# Patient Record
Sex: FEMALE | Race: OTHER | Employment: FULL TIME | URBAN - METROPOLITAN AREA
[De-identification: names, ages, dates, MRNs, and addresses within clinical notes are randomized per-mention and may not be internally consistent; named-entity substitution may affect disease eponyms.]

---

## 2021-04-23 ENCOUNTER — IMMUNIZATION (OUTPATIENT)
Dept: FAMILY MEDICINE CLINIC | Age: 36
End: 2021-04-23
Payer: COMMERCIAL

## 2021-04-23 PROCEDURE — 91300 COVID-19, PFIZER VACCINE 30MCG/0.3ML DOSE: CPT | Performed by: NURSE PRACTITIONER

## 2021-04-23 PROCEDURE — 0001A COVID-19, PFIZER VACCINE 30MCG/0.3ML DOSE: CPT | Performed by: NURSE PRACTITIONER

## 2021-05-14 ENCOUNTER — IMMUNIZATION (OUTPATIENT)
Dept: FAMILY MEDICINE CLINIC | Age: 36
End: 2021-05-14
Payer: COMMERCIAL

## 2021-05-14 PROCEDURE — 0002A COVID-19, PFIZER VACCINE 30MCG/0.3ML DOSE: CPT | Performed by: FAMILY MEDICINE

## 2021-05-14 PROCEDURE — 91300 COVID-19, PFIZER VACCINE 30MCG/0.3ML DOSE: CPT | Performed by: FAMILY MEDICINE

## 2021-10-25 ENCOUNTER — TELEPHONE (OUTPATIENT)
Dept: INTERNAL MEDICINE CLINIC | Age: 36
End: 2021-10-25

## 2021-10-25 NOTE — TELEPHONE ENCOUNTER
----- Message from Jessica Drake sent at 10/25/2021  1:25 PM EDT -----  Subject: Message to Provider    QUESTIONS  Information for Provider? new patient have an appt on 12/28/21 request if   any appt come available sooner please call patient   ---------------------------------------------------------------------------  --------------  [address]  What is the best way for the office to contact you? OK to leave message on   voicemail  Preferred Call Back Phone Number? [phone]  ---------------------------------------------------------------------------  --------------  SCRIPT ANSWERS  Relationship to Patient?  Self

## 2021-10-27 NOTE — TELEPHONE ENCOUNTER
Left message for patient to call back to offer appointment with [facility] if they wanted to do that

## 2021-12-28 ENCOUNTER — OFFICE VISIT (OUTPATIENT)
Dept: INTERNAL MEDICINE CLINIC | Age: 36
End: 2021-12-28
Payer: COMMERCIAL

## 2021-12-28 VITALS
BODY MASS INDEX: 27.19 KG/M2 | DIASTOLIC BLOOD PRESSURE: 66 MMHG | SYSTOLIC BLOOD PRESSURE: 124 MMHG | HEIGHT: 61 IN | HEART RATE: 72 BPM | WEIGHT: 144 LBS | OXYGEN SATURATION: 97 % | RESPIRATION RATE: 16 BRPM

## 2021-12-28 DIAGNOSIS — Z13.6 ENCOUNTER FOR LIPID SCREENING FOR CARDIOVASCULAR DISEASE: ICD-10-CM

## 2021-12-28 DIAGNOSIS — Z11.4 ENCOUNTER FOR SCREENING FOR HIV: ICD-10-CM

## 2021-12-28 DIAGNOSIS — Z12.4 CERVICAL CANCER SCREENING: ICD-10-CM

## 2021-12-28 DIAGNOSIS — Z13.220 ENCOUNTER FOR LIPID SCREENING FOR CARDIOVASCULAR DISEASE: ICD-10-CM

## 2021-12-28 DIAGNOSIS — Z13.1 SCREENING FOR DIABETES MELLITUS (DM): ICD-10-CM

## 2021-12-28 DIAGNOSIS — Z23 NEED FOR VACCINATION: ICD-10-CM

## 2021-12-28 DIAGNOSIS — Z11.59 ENCOUNTER FOR HEPATITIS C SCREENING TEST FOR LOW RISK PATIENT: ICD-10-CM

## 2021-12-28 DIAGNOSIS — Z00.00 ANNUAL PHYSICAL EXAM: Primary | ICD-10-CM

## 2021-12-28 PROCEDURE — 90732 PPSV23 VACC 2 YRS+ SUBQ/IM: CPT | Performed by: INTERNAL MEDICINE

## 2021-12-28 PROCEDURE — 90472 IMMUNIZATION ADMIN EACH ADD: CPT | Performed by: INTERNAL MEDICINE

## 2021-12-28 PROCEDURE — 90715 TDAP VACCINE 7 YRS/> IM: CPT | Performed by: INTERNAL MEDICINE

## 2021-12-28 PROCEDURE — 99385 PREV VISIT NEW AGE 18-39: CPT | Performed by: INTERNAL MEDICINE

## 2021-12-28 PROCEDURE — 90471 IMMUNIZATION ADMIN: CPT | Performed by: INTERNAL MEDICINE

## 2021-12-28 SDOH — ECONOMIC STABILITY: FOOD INSECURITY: WITHIN THE PAST 12 MONTHS, YOU WORRIED THAT YOUR FOOD WOULD RUN OUT BEFORE YOU GOT MONEY TO BUY MORE.: NEVER TRUE

## 2021-12-28 SDOH — ECONOMIC STABILITY: FOOD INSECURITY: WITHIN THE PAST 12 MONTHS, THE FOOD YOU BOUGHT JUST DIDN'T LAST AND YOU DIDN'T HAVE MONEY TO GET MORE.: NEVER TRUE

## 2021-12-28 ASSESSMENT — ENCOUNTER SYMPTOMS
PHOTOPHOBIA: 0
ABDOMINAL PAIN: 0
DIARRHEA: 0
EYE PAIN: 0
COLOR CHANGE: 0
BACK PAIN: 0
SINUS PRESSURE: 0
CONSTIPATION: 0
COUGH: 0
WHEEZING: 0
SHORTNESS OF BREATH: 0
SINUS PAIN: 0
VOMITING: 0
TROUBLE SWALLOWING: 0
NAUSEA: 0

## 2021-12-28 ASSESSMENT — PATIENT HEALTH QUESTIONNAIRE - PHQ9
SUM OF ALL RESPONSES TO PHQ QUESTIONS 1-9: 0
2. FEELING DOWN, DEPRESSED OR HOPELESS: 0
1. LITTLE INTEREST OR PLEASURE IN DOING THINGS: 0
SUM OF ALL RESPONSES TO PHQ9 QUESTIONS 1 & 2: 0

## 2021-12-28 ASSESSMENT — SOCIAL DETERMINANTS OF HEALTH (SDOH): HOW HARD IS IT FOR YOU TO PAY FOR THE VERY BASICS LIKE FOOD, HOUSING, MEDICAL CARE, AND HEATING?: NOT HARD AT ALL
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
## 2021-12-29 LAB
HPV COMMENT: NORMAL
HPV TYPE 16: NOT DETECTED
HPV TYPE 18: NOT DETECTED
HPVOH (OTHER TYPES): NOT DETECTED

## 2022-04-25 ENCOUNTER — TELEPHONE (OUTPATIENT)
Dept: INTERNAL MEDICINE CLINIC | Age: 37
End: 2022-04-25

## 2022-04-25 ENCOUNTER — OFFICE VISIT (OUTPATIENT)
Dept: INTERNAL MEDICINE CLINIC | Age: 37
End: 2022-04-25
Payer: COMMERCIAL

## 2022-04-25 VITALS
OXYGEN SATURATION: 99 % | HEART RATE: 78 BPM | HEIGHT: 61 IN | DIASTOLIC BLOOD PRESSURE: 72 MMHG | BODY MASS INDEX: 26.92 KG/M2 | WEIGHT: 142.6 LBS | SYSTOLIC BLOOD PRESSURE: 110 MMHG | RESPIRATION RATE: 12 BRPM

## 2022-04-25 DIAGNOSIS — L98.492 SKIN ULCER OF HAND WITH FAT LAYER EXPOSED (HCC): ICD-10-CM

## 2022-04-25 DIAGNOSIS — S61.401A AVULSION OF SKIN OF RIGHT HAND, INITIAL ENCOUNTER: Primary | ICD-10-CM

## 2022-04-25 PROCEDURE — 99213 OFFICE O/P EST LOW 20 MIN: CPT | Performed by: INTERNAL MEDICINE

## 2022-04-25 NOTE — PROGRESS NOTES
[facility]) Physicians  Internal Medicine  Patient Encounter  MARU MarteHARIKA, [facility]        Chief Complaint   Patient presents with    Laceration     right hand, above the pinky, painful, fell about 10 days ago. HPI: 31 y.o. female seen today with complaint of a right hand injury. The patient states she fell about 10 days ago injuring the right pinky finger. Patient states she tripped and fell. Patient fell on concrete. She thought she hit her hand somehow on her phone, but now thinks that her hand may have struck the concrete. She took a picture of what the hand look like initially. The picture appears as if she sustained a skin avulsion. There is no obvious laceration. The picture does not reveal any evidence of tendon exposure or joint disruption. She has another picture which shows some improvement but the picture appears to have fibrin in the wound bed. Today she states the wound looks even better. She does have pain with movement of the hand. She denies any increasing redness, drainage, fever, chills, sweats. She has been using Neosporin ointment. Tdap12/28/2021      Past Medical History:   Diagnosis Date    Anxiety     Asthma          MEDICATIONS:  Prior to Visit Medications    Not on File           Review of Systems - As per HPI      OBJECTIVE:  /72   Pulse 78   Resp 12   Ht 5' 1\" (1.549 m)   Wt 142 lb 9.6 oz (64.7 kg)   SpO2 99%   BMI 26.94 kg/m²   GEN: NAD, A&O, Non-toxic  SKIN: 1 cm x 1.2 cm ulceration with a clean wound bed. Granulation forming. No evidence of fibrin or biofilm. No purulent discharge. No surrounding erythema or induration. There is tenderness with palpation of the area. Movement of the right fifth MCP joint does elicit some pain but minimal.  She has good range of motion. ASSESSMENT[de-identified]  Anna Esparza was seen today for laceration.     Diagnoses and all orders for this visit:    Avulsion of skin of right hand, initial encounter  -- Wound looks like a skin avulsion rather than a laceration. -- Wound bed is clean with some granulation    Skin ulcer of hand with fat layer exposed ([address]  -- Wound seems to be healing based on the pictures she is provided      Additional Plan:  1. Wound dressed with bacitracin ointment, nonstick gauze, 2 x 2, and roll gauze  2. Wound care instructions provided    Discussed medications with patient who voiced understanding of their use, indication and potential side effects. Pt also understands the above recommendations. All questions answered. This note was generated completely or in part utilizing Dragon dictation speech recognition software. Occasionally, words are mistranscribed and despite editing, the text may contain inaccuracies due to incorrect word recognition.   If further clarification is needed please contact the office at [phone]       Electronically signed    Jasiel Hurt D.O.

## 2022-04-25 NOTE — TELEPHONE ENCOUNTER
----- Message from Ale Basurto sent at 4/25/2022  8:51 AM EDT -----  Subject: Appointment Request    Reason for Call: Routine Existing Condition Follow Up    QUESTIONS  Type of Appointment? Established Patient  Reason for appointment request? No appointments available during search  Additional Information for Provider? Patient has a deep laceration between   her right ring finger and little finger, has been using Neosporin, spoke   with pharmacist, suggested seeing PCP due to deepness of cut  ---------------------------------------------------------------------------  --------------  CALL BACK INFO  What is the best way for the office to contact you? OK to leave message on   voicemail  Preferred Call Back Phone Number? [phone]  ---------------------------------------------------------------------------  --------------  SCRIPT ANSWERS  Relationship to Patient? Self  Is this follow up request related to routine Diabetes Management? No  Have you been diagnosed with, awaiting test results for, or told that you   are suspected of having COVID-19 (Coronavirus)? (If patient has tested   negative or was tested as a requirement for work, school, or travel and   not based on symptoms, answer no)? No  Within the past 10 days have you developed any of the following symptoms   (answer no if symptoms have been present longer than 10 days or began   more than 10 days ago)? Fever or Chills, Cough, Shortness of breath or   difficulty breathing, Loss of taste or smell, Sore throat, Nasal   congestion, Sneezing or runny nose, Fatigue or generalized body aches   (answer no if pain is specific to a body part e.g. back pain), Diarrhea,   Headache? No  Have you had close contact with someone with COVID-19 in the last 7 days? No  (Service Expert  click yes below to proceed with [employer] As Usual   Scheduling)?  Yes

## 2022-04-25 NOTE — TELEPHONE ENCOUNTER
M for patient to call back. Patient has a cut between her fingers. She is using Neosporin. Her Pharmacist suggested she consult her PCP due to the deepness of the cut. When patient calls back, Please find out the following:     When did this incident occur? What was it cut on/with? How did the incident happen? What is the length of the cut?

## 2022-04-25 NOTE — PATIENT INSTRUCTIONS
Keep wound clean with Dial Soap and water    Pat dry    Place triple antibiotic on wound daily    Bandage dressing with a nonstick gauze, 2 x 2 dry gauze, roll gauze and secure in place    Observe for increasing redness, drainage, pain, fever         Patient Education        Wound Check: Care Instructions  Your Care Instructions  People have wounds that need care for many reasons. You may have a cut that needs care after surgery. You may have a cut or puncture wound from anaccident. Or you may have a wound because of a condition like diabetes. Whatever the cause of your wound, there are things you can do to care for it athome. Your doctor may also want you to come back for a wound check. The wound checklets the doctor know how your wound is healing and if you need more treatment. Follow-up care is a key part of your treatment and safety. Be sure to make and go to all appointments, and call your doctor if you are having problems. It's also a good idea to know your test results and keep alist of the medicines you take. How can you care for yourself at home?  If your doctor told you how to care for your wound, follow your doctor's instructions. If you did not get instructions, follow this general advice:  ? You may cover the wound with a thin layer of petroleum jelly, such as Vaseline, and a nonstick bandage. ? Apply more petroleum jelly and replace the bandage as needed.  Keep the wound dry for the first 24 to 48 hours. After this, you can shower if your doctor okays it. Pat the wound dry.  Be safe with medicines. Read and follow all instructions on the label. ? If the doctor gave you a prescription medicine for pain, take it as prescribed. ? If you are not taking a prescription pain medicine, ask your doctor if you can take an over-the-counter medicine.  If your doctor prescribed antibiotics, take them as directed. Do not stop taking them just because you feel better.  You need to take the full course of antibiotics.  If you have stitches, do not remove them on your own. Your doctor will tell you when to come back to have them removed.  If you have Steri-Strips, leave them on until they fall off.  If possible, prop up the injured area on a pillow anytime you sit or lie down during the next 3 days. Try to keep it above the level of your heart. This will help reduce swelling. When should you call for help? Call your doctor now or seek immediate medical care if:     You have new pain, or the pain gets worse.      The skin near the wound is cold or pale or changes color.      You have tingling, weakness, or numbness near the wound.      The wound starts to bleed, and blood soaks through the bandage. Oozing small amounts of blood is normal.      You have symptoms of infection, such as:  ? Increased pain, swelling, warmth, or redness. ? Red streaks leading from the wound. ? Pus draining from the wound. ? A fever. Watch closely for changes in your health, and be sure to contact your doctor if:     You do not get better as expected. Where can you learn more? Go to [email or web address].[email or web address]. org and sign in to your [email or web address] account. Enter  in the [employer] box to learn more about \"Wound Check: Care Instructions. \"     If you do not have an account, please click on the \"Sign Up Now\" link. Current as of: July 1, 2021               Content Version: 13.2  © 2006-2022 Healthwise, Incorporated. Care instructions adapted under license by [facility] [place] ([facility]). If you have questions about a medical condition or this instruction, always ask your healthcare professional. [place] [address] any warranty or liability for your use of this information.

## 2022-04-25 NOTE — TELEPHONE ENCOUNTER
Patient went to pharmacist yesterday about the cut on her finger and it occurred 10 days ago when she fell on concrete. Pharmacist believes it is infected. Very sharp pain. Yellow [place] around cut and turning black around the edges. About 1 1/2\" long,not sure how deep. Patient scheduled for same day at 11:30 today with Dr. Mylene Aguilar.

## 2022-05-02 ENCOUNTER — OFFICE VISIT (OUTPATIENT)
Dept: INTERNAL MEDICINE CLINIC | Age: 37
End: 2022-05-02
Payer: COMMERCIAL

## 2022-05-02 VITALS
HEIGHT: 61 IN | BODY MASS INDEX: 26.62 KG/M2 | WEIGHT: 141 LBS | SYSTOLIC BLOOD PRESSURE: 102 MMHG | RESPIRATION RATE: 12 BRPM | DIASTOLIC BLOOD PRESSURE: 64 MMHG | OXYGEN SATURATION: 99 % | HEART RATE: 80 BPM

## 2022-05-02 DIAGNOSIS — S61.401D: Primary | ICD-10-CM

## 2022-05-02 DIAGNOSIS — L98.492 SKIN ULCER OF HAND WITH FAT LAYER EXPOSED (HCC): ICD-10-CM

## 2022-05-02 PROCEDURE — 99212 OFFICE O/P EST SF 10 MIN: CPT | Performed by: INTERNAL MEDICINE

## 2022-05-02 NOTE — PROGRESS NOTES
[facility]) Physicians  Internal Medicine  Patient Encounter  Nai Carson D.O., [facility]        Chief Complaint   Patient presents with    Follow-up       HPI: 30 y.o. female seen today for short interval follow-up for a wound check. Patient had fallen and sustained a right hand skin avulsion. Patient states the wound is improving. There is still some discomfort. No redness reported. No drainage reported. She denies any fever, chills. She denies any increased swelling. Past Medical History:   Diagnosis Date    Anxiety     Asthma          MEDICATIONS:  Prior to Visit Medications    Not on File           Review of Systems - As per HPI      OBJECTIVE:  /64   Pulse 80   Resp 12   Ht 5' 1\" (1.549 m)   Wt 141 lb (64 kg)   SpO2 99%   BMI 26.64 kg/m²     SKIN: Traumatic ulceration of the right hand between the fourth and fifth MCP joints improving. Wound measures 0.8 cm x 0.7 cm previously (1 cm x 1.2 cm). Granulation tissue noted. No evidence of induration, lymphangitis. ASSESSMENT[de-identified]  Ruby Burgess was seen today for follow-up. Diagnoses and all orders for this visit:    Avulsion of skin of hand, right, subsequent encounter    Skin ulcer of hand with fat layer exposed ([address]        Additional Plan:  1. Wound care provided. The wound was cleaned with saline. A nonadherent gauze and roll gauze applied. 2.  Patient will transition to Vaseline and a nonstick gauze until healed. Patient advised to call should symptoms persist, worsen or if new symptoms develop. Discussed medications with patient who voiced understanding of their use, indication and potential side effects. Pt also understands the above recommendations. All questions answered. This note was generated completely or in part utilizing Dragon dictation speech recognition software. Occasionally, words are mistranscribed and despite editing, the text may contain inaccuracies due to incorrect word recognition.   If further clarification is needed please contact the office at [phone]       Electronically signed    Manasa Lagunas D.O.

## 2022-10-06 ENCOUNTER — OFFICE VISIT (OUTPATIENT)
Dept: INTERNAL MEDICINE CLINIC | Age: 37
End: 2022-10-06
Payer: COMMERCIAL

## 2022-10-06 VITALS — SYSTOLIC BLOOD PRESSURE: 110 MMHG | OXYGEN SATURATION: 99 % | DIASTOLIC BLOOD PRESSURE: 86 MMHG | HEART RATE: 66 BPM

## 2022-10-06 DIAGNOSIS — R10.9 RIGHT FLANK PAIN: Primary | ICD-10-CM

## 2022-10-06 DIAGNOSIS — N39.0 COMPLICATED UTI (URINARY TRACT INFECTION): ICD-10-CM

## 2022-10-06 LAB
BILIRUBIN, POC: NEGATIVE
BLOOD URINE, POC: NEGATIVE
CLARITY, POC: NORMAL
COLOR, POC: NORMAL
GLUCOSE URINE, POC: NEGATIVE
KETONES, POC: NORMAL
LEUKOCYTE EST, POC: NORMAL
NITRITE, POC: NEGATIVE
PH, POC: 6
PROTEIN, POC: NEGATIVE
SPECIFIC GRAVITY, POC: 1.03
UROBILINOGEN, POC: 0.2

## 2022-10-06 PROCEDURE — 81002 URINALYSIS NONAUTO W/O SCOPE: CPT | Performed by: INTERNAL MEDICINE

## 2022-10-06 PROCEDURE — 99214 OFFICE O/P EST MOD 30 MIN: CPT | Performed by: INTERNAL MEDICINE

## 2022-10-06 RX ORDER — NAPROXEN 500 MG/1
500 TABLET ORAL 2 TIMES DAILY WITH MEALS
Qty: 60 TABLET | Refills: 0 | Status: SHIPPED | OUTPATIENT
Start: 2022-10-06

## 2022-10-06 RX ORDER — CIPROFLOXACIN 500 MG/1
500 TABLET, FILM COATED ORAL 2 TIMES DAILY
Qty: 10 TABLET | Refills: 0 | Status: SHIPPED | OUTPATIENT
Start: 2022-10-06 | End: 2022-10-11

## 2022-10-06 ASSESSMENT — ENCOUNTER SYMPTOMS
ABDOMINAL PAIN: 1
DIARRHEA: 0
CONSTIPATION: 0
VOMITING: 0
NAUSEA: 0

## 2022-10-06 ASSESSMENT — PATIENT HEALTH QUESTIONNAIRE - PHQ9
SUM OF ALL RESPONSES TO PHQ QUESTIONS 1-9: 0
SUM OF ALL RESPONSES TO PHQ QUESTIONS 1-9: 0
SUM OF ALL RESPONSES TO PHQ9 QUESTIONS 1 & 2: 0
1. LITTLE INTEREST OR PLEASURE IN DOING THINGS: 0
2. FEELING DOWN, DEPRESSED OR HOPELESS: 0
SUM OF ALL RESPONSES TO PHQ QUESTIONS 1-9: 0
SUM OF ALL RESPONSES TO PHQ QUESTIONS 1-9: 0

## 2022-10-06 NOTE — PROGRESS NOTES
Carson Dumont (:  1985) is a 30 y.o. female,Established patient, here for evaluation of the following chief complaint(s):  Back Pain (LBP mainly on the R side,radiates into front lower abdomin x1 week )      ASSESSMENT/PLAN:  1. Right flank pain  Comments:  No rash. Some MSK strain on exam. CVA TTP. UA with moderate leuks. WIll treat for complicated UTI (lacks systemic illness). Orders:  -     POCT Urinalysis no Micro  -     Culture, Urine  -     naproxen (NAPROSYN) 500 MG tablet; Take 1 tablet by mouth 2 times daily (with meals), Disp-60 tablet, R-0Normal  -     ciprofloxacin (CIPRO) 500 MG tablet; Take 1 tablet by mouth 2 times daily for 5 days, Disp-10 tablet, R-0Normal  2. Complicated UTI (urinary tract infection)  -     ciprofloxacin (CIPRO) 500 MG tablet; Take 1 tablet by mouth 2 times daily for 5 days, Disp-10 tablet, R-0Normal    Return if symptoms worsen or fail to improve. SUBJECTIVE/OBJECTIVE:  HPI    Right low back pain x 1 week. No injury. Has improved some since last week  Previously constant. Now intermittent. Nothing makes it worse. Sitting down helps. Bending over hurts. Does not radiate. Back around to hip bone. No numbness/tingling/weakness  Feels like pressure. No urinary symptoms. No bowel changes. Review of Systems   Constitutional:  Negative for chills, fatigue and fever. Cardiovascular:  Negative for chest pain, palpitations and leg swelling. Gastrointestinal:  Positive for abdominal pain. Negative for constipation, diarrhea, nausea and vomiting. Psychiatric/Behavioral:  Negative for dysphoric mood. No current outpatient medications on file prior to visit. No current facility-administered medications on file prior to visit. Vitals:    10/06/22 1559   BP: 110/86   Pulse: 66   SpO2: 99%     Physical Exam  Constitutional:       Appearance: Normal appearance. Abdominal:      Tenderness: There is right CVA tenderness.  There is no left CVA tenderness. Musculoskeletal:      Comments: Some oblique spasm on the right   Skin:     Findings: No rash. Neurological:      Mental Status: She is alert. Lab Results   Component Value Date/Time    COLORU dark yellow 10/06/2022 04:44 PM    GLUCOSEU negative 10/06/2022 04:44 PM    KETUA negaitve 10/06/2022 04:44 PM    BILIRUBINUR negative 10/06/2022 04:44 PM   Moderate leukocytes. An electronic signature was used to authenticate this note.     --Lisette Rivas MD

## 2022-10-07 LAB — URINE CULTURE, ROUTINE: NORMAL

## 2023-09-22 ENCOUNTER — TELEPHONE (OUTPATIENT)
Dept: INTERNAL MEDICINE CLINIC | Age: 38
End: 2023-09-22

## 2023-09-22 NOTE — TELEPHONE ENCOUNTER
Patient is calling to schedule her Annual Physical.  Patient scheduled for 10/10/23. At this appointment she would also like to address a Burning sensation in stomach that goes into her chest She states this is not an urgent concern and can wait for her upcoming appointment. Please advise. Is another appointment needed?

## 2023-10-10 ENCOUNTER — OFFICE VISIT (OUTPATIENT)
Dept: INTERNAL MEDICINE CLINIC | Age: 38
End: 2023-10-10
Payer: COMMERCIAL

## 2023-10-10 VITALS
SYSTOLIC BLOOD PRESSURE: 108 MMHG | HEART RATE: 56 BPM | RESPIRATION RATE: 14 BRPM | WEIGHT: 148 LBS | HEIGHT: 61 IN | DIASTOLIC BLOOD PRESSURE: 64 MMHG | OXYGEN SATURATION: 95 % | BODY MASS INDEX: 27.94 KG/M2

## 2023-10-10 DIAGNOSIS — Z13.1 SCREENING FOR DIABETES MELLITUS (DM): ICD-10-CM

## 2023-10-10 DIAGNOSIS — R10.13 EPIGASTRIC PAIN: ICD-10-CM

## 2023-10-10 DIAGNOSIS — Z00.00 ANNUAL PHYSICAL EXAM: ICD-10-CM

## 2023-10-10 DIAGNOSIS — Z13.6 ENCOUNTER FOR LIPID SCREENING FOR CARDIOVASCULAR DISEASE: ICD-10-CM

## 2023-10-10 DIAGNOSIS — Z13.220 ENCOUNTER FOR LIPID SCREENING FOR CARDIOVASCULAR DISEASE: ICD-10-CM

## 2023-10-10 DIAGNOSIS — Z00.00 ANNUAL PHYSICAL EXAM: Primary | ICD-10-CM

## 2023-10-10 PROCEDURE — 99395 PREV VISIT EST AGE 18-39: CPT | Performed by: INTERNAL MEDICINE

## 2023-10-10 SDOH — ECONOMIC STABILITY: INCOME INSECURITY: HOW HARD IS IT FOR YOU TO PAY FOR THE VERY BASICS LIKE FOOD, HOUSING, MEDICAL CARE, AND HEATING?: NOT HARD AT ALL

## 2023-10-10 SDOH — ECONOMIC STABILITY: FOOD INSECURITY: WITHIN THE PAST 12 MONTHS, YOU WORRIED THAT YOUR FOOD WOULD RUN OUT BEFORE YOU GOT MONEY TO BUY MORE.: NEVER TRUE

## 2023-10-10 SDOH — ECONOMIC STABILITY: FOOD INSECURITY: WITHIN THE PAST 12 MONTHS, THE FOOD YOU BOUGHT JUST DIDN'T LAST AND YOU DIDN'T HAVE MONEY TO GET MORE.: NEVER TRUE

## 2023-10-10 SDOH — ECONOMIC STABILITY: HOUSING INSECURITY
IN THE LAST 12 MONTHS, WAS THERE A TIME WHEN YOU DID NOT HAVE A STEADY PLACE TO SLEEP OR SLEPT IN A SHELTER (INCLUDING NOW)?: NO

## 2023-10-10 ASSESSMENT — PATIENT HEALTH QUESTIONNAIRE - PHQ9
SUM OF ALL RESPONSES TO PHQ9 QUESTIONS 1 & 2: 0
SUM OF ALL RESPONSES TO PHQ QUESTIONS 1-9: 0
SUM OF ALL RESPONSES TO PHQ QUESTIONS 1-9: 0
SUM OF ALL RESPONSES TO PHQ9 QUESTIONS 1 & 2: 0
SUM OF ALL RESPONSES TO PHQ QUESTIONS 1-9: 0
1. LITTLE INTEREST OR PLEASURE IN DOING THINGS: 0
SUM OF ALL RESPONSES TO PHQ QUESTIONS 1-9: 0
2. FEELING DOWN, DEPRESSED OR HOPELESS: NOT AT ALL
1. LITTLE INTEREST OR PLEASURE IN DOING THINGS: NOT AT ALL
2. FEELING DOWN, DEPRESSED OR HOPELESS: 0

## 2023-10-10 ASSESSMENT — ENCOUNTER SYMPTOMS
CONSTIPATION: 0
VOMITING: 0
SHORTNESS OF BREATH: 0
COLOR CHANGE: 0
NAUSEA: 0
PHOTOPHOBIA: 0
WHEEZING: 0
EYE PAIN: 0
TROUBLE SWALLOWING: 0
SINUS PAIN: 0
BACK PAIN: 0
ABDOMINAL PAIN: 0
SINUS PRESSURE: 0
COUGH: 0
DIARRHEA: 0

## 2023-10-11 LAB
ALBUMIN SERPL-MCNC: 4.8 G/DL (ref 3.4–5)
ALBUMIN/GLOB SERPL: 1.7 {RATIO} (ref 1.1–2.2)
ALP SERPL-CCNC: 52 U/L (ref 40–129)
ALT SERPL-CCNC: 14 U/L (ref 10–40)
ANION GAP SERPL CALCULATED.3IONS-SCNC: 11 MMOL/L (ref 3–16)
AST SERPL-CCNC: 13 U/L (ref 15–37)
BASOPHILS # BLD: 0.1 K/UL (ref 0–0.2)
BASOPHILS NFR BLD: 0.7 %
BILIRUB SERPL-MCNC: <0.2 MG/DL (ref 0–1)
BUN SERPL-MCNC: 11 MG/DL (ref 7–20)
CALCIUM SERPL-MCNC: 9.5 MG/DL (ref 8.3–10.6)
CHLORIDE SERPL-SCNC: 103 MMOL/L (ref 99–110)
CHOLEST SERPL-MCNC: 210 MG/DL (ref 0–199)
CO2 SERPL-SCNC: 26 MMOL/L (ref 21–32)
CREAT SERPL-MCNC: 0.8 MG/DL (ref 0.6–1.1)
DEPRECATED RDW RBC AUTO: 13.9 % (ref 12.4–15.4)
EOSINOPHIL # BLD: 0.1 K/UL (ref 0–0.6)
EOSINOPHIL NFR BLD: 1.2 %
EST. AVERAGE GLUCOSE BLD GHB EST-MCNC: 96.8 MG/DL
GFR SERPLBLD CREATININE-BSD FMLA CKD-EPI: >60 ML/MIN/{1.73_M2}
GLUCOSE SERPL-MCNC: 83 MG/DL (ref 70–99)
HBA1C MFR BLD: 5 %
HCT VFR BLD AUTO: 38.9 % (ref 36–48)
HDLC SERPL-MCNC: 53 MG/DL (ref 40–60)
HGB BLD-MCNC: 13.3 G/DL (ref 12–16)
LDLC SERPL CALC-MCNC: 128 MG/DL
LIPASE SERPL-CCNC: 55 U/L (ref 13–60)
LYMPHOCYTES # BLD: 2.8 K/UL (ref 1–5.1)
LYMPHOCYTES NFR BLD: 31 %
MCH RBC QN AUTO: 29.1 PG (ref 26–34)
MCHC RBC AUTO-ENTMCNC: 34.1 G/DL (ref 31–36)
MCV RBC AUTO: 85.2 FL (ref 80–100)
MONOCYTES # BLD: 0.6 K/UL (ref 0–1.3)
MONOCYTES NFR BLD: 6.3 %
NEUTROPHILS # BLD: 5.4 K/UL (ref 1.7–7.7)
NEUTROPHILS NFR BLD: 60.8 %
PLATELET # BLD AUTO: 278 K/UL (ref 135–450)
PMV BLD AUTO: 12.1 FL (ref 5–10.5)
POTASSIUM SERPL-SCNC: 4.2 MMOL/L (ref 3.5–5.1)
PROT SERPL-MCNC: 7.6 G/DL (ref 6.4–8.2)
RBC # BLD AUTO: 4.56 M/UL (ref 4–5.2)
SODIUM SERPL-SCNC: 140 MMOL/L (ref 136–145)
TRIGL SERPL-MCNC: 145 MG/DL (ref 0–150)
TSH SERPL DL<=0.005 MIU/L-ACNC: 1.52 UIU/ML (ref 0.27–4.2)
VLDLC SERPL CALC-MCNC: 29 MG/DL
WBC # BLD AUTO: 8.9 K/UL (ref 4–11)

## 2024-01-24 ENCOUNTER — TELEPHONE (OUTPATIENT)
Dept: INTERNAL MEDICINE CLINIC | Age: 39
End: 2024-01-24

## 2024-01-24 NOTE — TELEPHONE ENCOUNTER
Patient is calling in for  in regards to current sx , per patient she has had a cough  since last Tuesday and chest pain that \" feels like a heart attack\".   Per patient has tested negative for covid on Friday. Per patient no other sx . Patient offered an appointment with NP offsite today and declined , patient only wants to see .     Please advise.

## 2024-01-25 ENCOUNTER — OFFICE VISIT (OUTPATIENT)
Dept: INTERNAL MEDICINE CLINIC | Age: 39
End: 2024-01-25
Payer: COMMERCIAL

## 2024-01-25 VITALS
SYSTOLIC BLOOD PRESSURE: 90 MMHG | DIASTOLIC BLOOD PRESSURE: 54 MMHG | HEART RATE: 82 BPM | BODY MASS INDEX: 30.42 KG/M2 | WEIGHT: 161 LBS | OXYGEN SATURATION: 99 %

## 2024-01-25 DIAGNOSIS — R05.2 SUBACUTE COUGH: Primary | ICD-10-CM

## 2024-01-25 PROCEDURE — 99214 OFFICE O/P EST MOD 30 MIN: CPT | Performed by: INTERNAL MEDICINE

## 2024-01-25 RX ORDER — CODEINE PHOSPHATE/GUAIFENESIN 10-100MG/5
5 LIQUID (ML) ORAL 4 TIMES DAILY PRN
Qty: 118 ML | Refills: 0 | Status: SHIPPED | OUTPATIENT
Start: 2024-01-25 | End: 2024-01-31

## 2024-01-25 RX ORDER — METHYLPREDNISOLONE 4 MG/1
TABLET ORAL
Qty: 21 TABLET | Refills: 0 | Status: SHIPPED | OUTPATIENT
Start: 2024-01-25 | End: 2024-01-31

## 2024-01-25 RX ORDER — ALBUTEROL SULFATE 90 UG/1
2 AEROSOL, METERED RESPIRATORY (INHALATION) 4 TIMES DAILY PRN
Qty: 18 G | Refills: 5 | Status: SHIPPED | OUTPATIENT
Start: 2024-01-25

## 2024-01-25 RX ORDER — BENZONATATE 200 MG/1
200 CAPSULE ORAL 3 TIMES DAILY PRN
COMMUNITY

## 2024-01-25 NOTE — PROGRESS NOTES
External ear normal.      Nose: Nose normal.      Mouth/Throat:      Mouth: Mucous membranes are moist.      Pharynx: Oropharynx is clear.   Eyes:      General: No scleral icterus.     Conjunctiva/sclera: Conjunctivae normal.   Cardiovascular:      Rate and Rhythm: Normal rate and regular rhythm.      Pulses: Normal pulses.      Heart sounds: Normal heart sounds. No murmur heard.     No friction rub. No gallop.   Pulmonary:      Effort: Pulmonary effort is normal.      Breath sounds: Normal breath sounds. No wheezing, rhonchi or rales.   Abdominal:      General: Abdomen is flat. Bowel sounds are normal.   Musculoskeletal:         General: No deformity.      Cervical back: Normal range of motion.      Right lower leg: No edema.      Left lower leg: No edema.   Skin:     General: Skin is warm and dry.      Findings: No rash.   Neurological:      General: No focal deficit present.      Mental Status: She is alert.      Coordination: Coordination normal.      Gait: Gait normal.   Psychiatric:         Mood and Affect: Mood normal.         Behavior: Behavior normal.                 An electronic signature was used to authenticate this note.    --Lillie Herzog MD

## 2024-01-26 ENCOUNTER — HOSPITAL ENCOUNTER (OUTPATIENT)
Age: 39
Discharge: HOME | End: 2024-01-26
Payer: COMMERCIAL

## 2024-01-26 ENCOUNTER — HOSPITAL ENCOUNTER (OUTPATIENT)
Dept: GENERAL RADIOLOGY | Age: 39
Discharge: HOME | End: 2024-01-26
Payer: COMMERCIAL

## 2024-01-26 DIAGNOSIS — R05.2 SUBACUTE COUGH: ICD-10-CM

## 2024-01-26 PROCEDURE — 71046 X-RAY EXAM CHEST 2 VIEWS: CPT

## 2024-01-29 ENCOUNTER — TELEPHONE (OUTPATIENT)
Dept: INTERNAL MEDICINE CLINIC | Age: 39
End: 2024-01-29

## 2024-01-29 NOTE — TELEPHONE ENCOUNTER
Patient is calling in for  in regards to xray imaging result's. Per 's note:    This looks good. Good news.    Patient understood and had no further questions for provider.

## 2025-07-08 ENCOUNTER — APPOINTMENT (OUTPATIENT)
Dept: CT IMAGING | Age: 40
End: 2025-07-08
Payer: COMMERCIAL

## 2025-07-08 ENCOUNTER — HOSPITAL ENCOUNTER (EMERGENCY)
Age: 40
Discharge: HOME | End: 2025-07-08
Attending: EMERGENCY MEDICINE
Payer: COMMERCIAL

## 2025-07-08 ENCOUNTER — APPOINTMENT (OUTPATIENT)
Dept: GENERAL RADIOLOGY | Age: 40
End: 2025-07-08
Payer: COMMERCIAL

## 2025-07-08 VITALS
RESPIRATION RATE: 25 BRPM | HEART RATE: 89 BPM | TEMPERATURE: 98.1 F | SYSTOLIC BLOOD PRESSURE: 113 MMHG | HEIGHT: 61 IN | BODY MASS INDEX: 28.32 KG/M2 | DIASTOLIC BLOOD PRESSURE: 82 MMHG | WEIGHT: 150 LBS | OXYGEN SATURATION: 99 %

## 2025-07-08 DIAGNOSIS — R07.9 ACUTE NONSPECIFIC CHEST PAIN WITH LOW RISK OF CORONARY ARTERY DISEASE: Primary | ICD-10-CM

## 2025-07-08 DIAGNOSIS — T78.40XA ALLERGIC REACTION, INITIAL ENCOUNTER: ICD-10-CM

## 2025-07-08 DIAGNOSIS — Z91.89 ACUTE NONSPECIFIC CHEST PAIN WITH LOW RISK OF CORONARY ARTERY DISEASE: Primary | ICD-10-CM

## 2025-07-08 LAB
ALBUMIN SERPL-MCNC: 4.5 G/DL (ref 3.4–5)
ALBUMIN/GLOB SERPL: 1.4 {RATIO} (ref 1.1–2.2)
ALP SERPL-CCNC: 60 U/L (ref 40–129)
ALT SERPL-CCNC: 19 U/L (ref 10–40)
ANION GAP SERPL CALCULATED.3IONS-SCNC: 13 MMOL/L (ref 3–16)
AST SERPL-CCNC: 18 U/L (ref 15–37)
BACTERIA URNS QL MICRO: ABNORMAL /HPF
BASOPHILS # BLD: 0.1 K/UL (ref 0–0.2)
BASOPHILS NFR BLD: 0.5 %
BILIRUB SERPL-MCNC: 0.4 MG/DL (ref 0–1)
BILIRUB UR QL STRIP.AUTO: NEGATIVE
BUN SERPL-MCNC: 7 MG/DL (ref 7–20)
CALCIUM SERPL-MCNC: 9.4 MG/DL (ref 8.3–10.6)
CHLORIDE SERPL-SCNC: 103 MMOL/L (ref 99–110)
CLARITY UR: CLEAR
CO2 SERPL-SCNC: 23 MMOL/L (ref 21–32)
COLOR UR: YELLOW
CREAT SERPL-MCNC: 0.8 MG/DL (ref 0.6–1.1)
D-DIMER QUANTITATIVE: <0.27 UG/ML FEU (ref 0–0.6)
DEPRECATED RDW RBC AUTO: 14.3 % (ref 12.4–15.4)
EOSINOPHIL # BLD: 0.1 K/UL (ref 0–0.6)
EOSINOPHIL NFR BLD: 1.4 %
EPI CELLS #/AREA URNS AUTO: 1 /HPF (ref 0–5)
GFR SERPLBLD CREATININE-BSD FMLA CKD-EPI: >90 ML/MIN/{1.73_M2}
GLUCOSE SERPL-MCNC: 90 MG/DL (ref 70–99)
GLUCOSE UR STRIP.AUTO-MCNC: NEGATIVE MG/DL
HCG SERPL QL: NEGATIVE
HCT VFR BLD AUTO: 37.6 % (ref 36–48)
HGB BLD-MCNC: 13 G/DL (ref 12–16)
HGB UR QL STRIP.AUTO: NEGATIVE
HYALINE CASTS #/AREA URNS AUTO: 0 /LPF (ref 0–8)
INR PPP: 1.01 (ref 0.86–1.14)
KETONES UR STRIP.AUTO-MCNC: ABNORMAL MG/DL
LEUKOCYTE ESTERASE UR QL STRIP.AUTO: ABNORMAL
LIPASE SERPL-CCNC: 44 U/L (ref 13–60)
LYMPHOCYTES # BLD: 2.4 K/UL (ref 1–5.1)
LYMPHOCYTES NFR BLD: 24.4 %
MCH RBC QN AUTO: 28.3 PG (ref 26–34)
MCHC RBC AUTO-ENTMCNC: 34.6 G/DL (ref 31–36)
MCV RBC AUTO: 81.8 FL (ref 80–100)
MONOCYTES # BLD: 0.6 K/UL (ref 0–1.3)
MONOCYTES NFR BLD: 6.4 %
NEUTROPHILS # BLD: 6.7 K/UL (ref 1.7–7.7)
NEUTROPHILS NFR BLD: 67.3 %
NITRITE UR QL STRIP.AUTO: NEGATIVE
NT-PROBNP SERPL-MCNC: 118 PG/ML (ref 0–124)
PH UR STRIP.AUTO: 5 [PH] (ref 5–8)
PLATELET # BLD AUTO: 268 K/UL (ref 135–450)
PMV BLD AUTO: 9.8 FL (ref 5–10.5)
POTASSIUM SERPL-SCNC: 3.7 MMOL/L (ref 3.5–5.1)
PROT SERPL-MCNC: 7.7 G/DL (ref 6.4–8.2)
PROT UR STRIP.AUTO-MCNC: NEGATIVE MG/DL
PROTHROMBIN TIME: 13.6 SEC (ref 12.1–14.9)
RBC # BLD AUTO: 4.6 M/UL (ref 4–5.2)
RBC CLUMPS #/AREA URNS AUTO: 0 /HPF (ref 0–4)
SODIUM SERPL-SCNC: 139 MMOL/L (ref 136–145)
SP GR UR STRIP.AUTO: 1.01 (ref 1–1.03)
TROPONIN, HIGH SENSITIVITY: <6 NG/L (ref 0–14)
TROPONIN, HIGH SENSITIVITY: <6 NG/L (ref 0–14)
UA COMPLETE W REFLEX CULTURE PNL UR: YES
UA DIPSTICK W REFLEX MICRO PNL UR: YES
URN SPEC COLLECT METH UR: ABNORMAL
UROBILINOGEN UR STRIP-ACNC: 0.2 E.U./DL
WBC # BLD AUTO: 10 K/UL (ref 4–11)
WBC #/AREA URNS AUTO: 10 /HPF (ref 0–5)

## 2025-07-08 PROCEDURE — 71250 CT THORAX DX C-: CPT

## 2025-07-08 PROCEDURE — 85379 FIBRIN DEGRADATION QUANT: CPT

## 2025-07-08 PROCEDURE — 6360000004 HC RX CONTRAST MEDICATION: Performed by: EMERGENCY MEDICINE

## 2025-07-08 PROCEDURE — 81001 URINALYSIS AUTO W/SCOPE: CPT

## 2025-07-08 PROCEDURE — 93005 ELECTROCARDIOGRAM TRACING: CPT | Performed by: EMERGENCY MEDICINE

## 2025-07-08 PROCEDURE — 96375 TX/PRO/DX INJ NEW DRUG ADDON: CPT

## 2025-07-08 PROCEDURE — 96372 THER/PROPH/DIAG INJ SC/IM: CPT

## 2025-07-08 PROCEDURE — 87086 URINE CULTURE/COLONY COUNT: CPT

## 2025-07-08 PROCEDURE — 2500000003 HC RX 250 WO HCPCS

## 2025-07-08 PROCEDURE — 83880 ASSAY OF NATRIURETIC PEPTIDE: CPT

## 2025-07-08 PROCEDURE — 6360000002 HC RX W HCPCS: Performed by: EMERGENCY MEDICINE

## 2025-07-08 PROCEDURE — 71275 CT ANGIOGRAPHY CHEST: CPT

## 2025-07-08 PROCEDURE — 71045 X-RAY EXAM CHEST 1 VIEW: CPT

## 2025-07-08 PROCEDURE — 36415 COLL VENOUS BLD VENIPUNCTURE: CPT

## 2025-07-08 PROCEDURE — 84703 CHORIONIC GONADOTROPIN ASSAY: CPT

## 2025-07-08 PROCEDURE — 85025 COMPLETE CBC W/AUTO DIFF WBC: CPT

## 2025-07-08 PROCEDURE — 80053 COMPREHEN METABOLIC PANEL: CPT

## 2025-07-08 PROCEDURE — 99285 EMERGENCY DEPT VISIT HI MDM: CPT

## 2025-07-08 PROCEDURE — 2500000003 HC RX 250 WO HCPCS: Performed by: EMERGENCY MEDICINE

## 2025-07-08 PROCEDURE — 84484 ASSAY OF TROPONIN QUANT: CPT

## 2025-07-08 PROCEDURE — 83690 ASSAY OF LIPASE: CPT

## 2025-07-08 PROCEDURE — 85610 PROTHROMBIN TIME: CPT

## 2025-07-08 PROCEDURE — 96374 THER/PROPH/DIAG INJ IV PUSH: CPT

## 2025-07-08 RX ORDER — DIPHENHYDRAMINE HYDROCHLORIDE 50 MG/ML
50 INJECTION, SOLUTION INTRAMUSCULAR; INTRAVENOUS ONCE
Status: COMPLETED | OUTPATIENT
Start: 2025-07-08 | End: 2025-07-08

## 2025-07-08 RX ORDER — KETOROLAC TROMETHAMINE 15 MG/ML
15 INJECTION, SOLUTION INTRAMUSCULAR; INTRAVENOUS ONCE
Status: COMPLETED | OUTPATIENT
Start: 2025-07-08 | End: 2025-07-08

## 2025-07-08 RX ORDER — EPINEPHRINE 1 MG/ML
0.3 INJECTION, SOLUTION, CONCENTRATE INTRAVENOUS ONCE
Status: COMPLETED | OUTPATIENT
Start: 2025-07-08 | End: 2025-07-08

## 2025-07-08 RX ORDER — WATER 10 ML/10ML
INJECTION INTRAMUSCULAR; INTRAVENOUS; SUBCUTANEOUS
Status: COMPLETED
Start: 2025-07-08 | End: 2025-07-08

## 2025-07-08 RX ORDER — PREDNISONE 50 MG/1
50 TABLET ORAL DAILY
Qty: 5 TABLET | Refills: 0 | Status: SHIPPED | OUTPATIENT
Start: 2025-07-08 | End: 2025-07-13

## 2025-07-08 RX ORDER — IOPAMIDOL 755 MG/ML
75 INJECTION, SOLUTION INTRAVASCULAR
Status: COMPLETED | OUTPATIENT
Start: 2025-07-08 | End: 2025-07-08

## 2025-07-08 RX ORDER — IBUPROFEN 800 MG/1
800 TABLET, FILM COATED ORAL 3 TIMES DAILY
Qty: 21 TABLET | Refills: 0 | Status: SHIPPED | OUTPATIENT
Start: 2025-07-08 | End: 2025-07-08

## 2025-07-08 RX ORDER — PANTOPRAZOLE SODIUM 40 MG/1
40 FOR SUSPENSION ORAL
Qty: 10 PACKET | Refills: 0 | Status: SHIPPED | OUTPATIENT
Start: 2025-07-08 | End: 2025-07-18

## 2025-07-08 RX ORDER — DIPHENHYDRAMINE HCL 25 MG
50 TABLET ORAL EVERY 6 HOURS PRN
Qty: 56 TABLET | Refills: 0 | Status: SHIPPED | OUTPATIENT
Start: 2025-07-08

## 2025-07-08 RX ORDER — METHYLPREDNISOLONE SODIUM SUCCINATE 125 MG/2ML
125 INJECTION INTRAMUSCULAR; INTRAVENOUS ONCE
Status: COMPLETED | OUTPATIENT
Start: 2025-07-08 | End: 2025-07-08

## 2025-07-08 RX ADMIN — WATER 2 ML: 1 INJECTION INTRAMUSCULAR; INTRAVENOUS; SUBCUTANEOUS at 19:32

## 2025-07-08 RX ADMIN — DIPHENHYDRAMINE HYDROCHLORIDE 50 MG: 50 INJECTION INTRAMUSCULAR; INTRAVENOUS at 19:27

## 2025-07-08 RX ADMIN — KETOROLAC TROMETHAMINE 15 MG: 15 INJECTION, SOLUTION INTRAMUSCULAR; INTRAVENOUS at 18:13

## 2025-07-08 RX ADMIN — IOPAMIDOL 75 ML: 755 INJECTION, SOLUTION INTRAVENOUS at 20:02

## 2025-07-08 RX ADMIN — FAMOTIDINE 20 MG: 10 INJECTION, SOLUTION INTRAVENOUS at 18:15

## 2025-07-08 RX ADMIN — EPINEPHRINE 0.3 MG: 1 INJECTION, SOLUTION, CONCENTRATE INTRAVENOUS at 19:25

## 2025-07-08 RX ADMIN — METHYLPREDNISOLONE SODIUM SUCCINATE 125 MG: 125 INJECTION, POWDER, LYOPHILIZED, FOR SOLUTION INTRAMUSCULAR; INTRAVENOUS at 19:32

## 2025-07-08 ASSESSMENT — PAIN DESCRIPTION - LOCATION
LOCATION: CHEST
LOCATION: CHEST

## 2025-07-08 ASSESSMENT — PAIN SCALES - GENERAL
PAINLEVEL_OUTOF10: 5
PAINLEVEL_OUTOF10: 5

## 2025-07-08 ASSESSMENT — PAIN DESCRIPTION - DESCRIPTORS: DESCRIPTORS: PRESSURE

## 2025-07-08 ASSESSMENT — PAIN - FUNCTIONAL ASSESSMENT: PAIN_FUNCTIONAL_ASSESSMENT: 0-10
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
## 2025-07-08 NOTE — ED NOTES
Pt approaches registration stating that she is experiencing difficulty breathing.  Pt brought back to [facility] A by technician.  This RN immediately to room and finds pt crying and stating that her throat feels like it's closing up on the right side.  Pt brought to [facility] 38 by ED technician and placed on VS devices including telemetry.  Dr. Daigle notified and immediately to room.  See MAR for response.

## 2025-07-09 LAB
BACTERIA UR CULT: NORMAL
EKG ATRIAL RATE: 70 BPM
EKG ATRIAL RATE: 93 BPM
EKG DIAGNOSIS: NORMAL
EKG DIAGNOSIS: NORMAL
EKG P AXIS: 70 DEGREES
EKG P AXIS: 70 DEGREES
EKG P-R INTERVAL: 124 MS
EKG P-R INTERVAL: 142 MS
EKG Q-T INTERVAL: 360 MS
EKG Q-T INTERVAL: 396 MS
EKG QRS DURATION: 76 MS
EKG QRS DURATION: 82 MS
EKG QTC CALCULATION (BAZETT): 427 MS
EKG QTC CALCULATION (BAZETT): 447 MS
EKG R AXIS: 56 DEGREES
EKG R AXIS: 62 DEGREES
EKG T AXIS: 26 DEGREES
EKG T AXIS: 6 DEGREES
EKG VENTRICULAR RATE: 70 BPM
EKG VENTRICULAR RATE: 93 BPM

## 2025-07-09 PROCEDURE — 93010 ELECTROCARDIOGRAM REPORT: CPT | Performed by: INTERNAL MEDICINE

## 2025-07-09 NOTE — ED PROVIDER NOTES
[facility] EMERGENCY DEPARTMENT    This patient was initially evaluated by Dr Daigle. Briefly, 40 y.o. female presented with chest pain.  Apparently had possible allergic reaction while in the emergency department.  At the time of signout, the following was pending:    - f/u imaging   - observe after administration of epinephrine   - reassess/dispo    CTA Chest W/Contrast R/O Aortic Dissection   Final Result   1. No evidence of aortic dissection, aneurysm, or intramural hematoma.   2. No pulmonary embolism.   3. Mild cardiomegaly.   4. Circumferential esophageal wall thickening, possibly infectious in etiology.   5. Small hiatal hernia.   6. Diffuse hepatic steatosis.   7. Cholelithiasis without evidence of cholecystitis; gallbladder incompletely visualized. Right upper quadrant ultrasound exam may be helpful if clinically indicated            CT CHEST WO CONTRAST   Final Result   1. No evidence of aortic dissection.   2. Diffuse esophageal wall thickening suggesting esophagitis.   3. Cholelithiasis.         XR CHEST PORTABLE   Final Result   No acute process.              On my reassessment, the patient is resting comfortably in bed in no acute distress.  She was observed for several hours after the administration of epinephrine and showed no evidence of rebound phenomenon.  CT imaging showed no evidence of aortic pathology.  Did show incidental finding of cholelithiasis but no evidence of cholecystitis.  Patient without right upper quadrant abdominal pain.  She was, however, informed of the incidental finding.  She does have diffuse esophageal wall thickening suggestive of esophagitis which might be the etiology of her symptoms.  The upcoming provider has already prescribed a PPI.  She will also be discharged on a steroid burst and antihistamines.  She was given usual strict return precautions.    CLINICAL IMPRESSION  1. Acute nonspecific chest pain with low risk of coronary artery disease    2. Allergic